# Patient Record
Sex: FEMALE | Race: BLACK OR AFRICAN AMERICAN
[De-identification: names, ages, dates, MRNs, and addresses within clinical notes are randomized per-mention and may not be internally consistent; named-entity substitution may affect disease eponyms.]

---

## 2019-08-27 ENCOUNTER — HOSPITAL ENCOUNTER (OUTPATIENT)
Dept: HOSPITAL 47 - CPPFTMAIN | Age: 64
End: 2019-08-27
Attending: INTERNAL MEDICINE
Payer: MEDICARE

## 2019-08-27 DIAGNOSIS — R06.02: Primary | ICD-10-CM

## 2019-08-27 PROCEDURE — 94726 PLETHYSMOGRAPHY LUNG VOLUMES: CPT

## 2019-08-27 PROCEDURE — 94060 EVALUATION OF WHEEZING: CPT

## 2019-08-27 PROCEDURE — 94729 DIFFUSING CAPACITY: CPT

## 2021-11-17 ENCOUNTER — HOSPITAL ENCOUNTER (EMERGENCY)
Dept: HOSPITAL 47 - EC | Age: 66
Discharge: HOME | End: 2021-11-17
Payer: MEDICARE

## 2021-11-17 VITALS — DIASTOLIC BLOOD PRESSURE: 80 MMHG | HEART RATE: 72 BPM | SYSTOLIC BLOOD PRESSURE: 140 MMHG | TEMPERATURE: 98.2 F

## 2021-11-17 VITALS — RESPIRATION RATE: 20 BRPM

## 2021-11-17 DIAGNOSIS — Z91.040: ICD-10-CM

## 2021-11-17 DIAGNOSIS — E78.5: ICD-10-CM

## 2021-11-17 DIAGNOSIS — Z79.899: ICD-10-CM

## 2021-11-17 DIAGNOSIS — I10: ICD-10-CM

## 2021-11-17 DIAGNOSIS — Z20.822: Primary | ICD-10-CM

## 2021-11-17 DIAGNOSIS — Z88.8: ICD-10-CM

## 2021-11-17 DIAGNOSIS — J45.909: ICD-10-CM

## 2021-11-17 DIAGNOSIS — Z88.5: ICD-10-CM

## 2021-11-17 PROCEDURE — 87635 SARS-COV-2 COVID-19 AMP PRB: CPT

## 2021-11-17 PROCEDURE — 99283 EMERGENCY DEPT VISIT LOW MDM: CPT

## 2021-11-17 NOTE — ED
General Adult HPI





- General


Chief complaint: Recheck/Abnormal Lab/Rx


Stated complaint: Covid Swab


Time Seen by Provider: 11/17/21 15:42


Source: patient, RN notes reviewed


Mode of arrival: ambulatory


Limitations: no limitations





- History of Present Illness


Initial comments: 





This a 66-year-old female presents emergency [facility] with chief complaint of 

needing COVID-19 testing.  Patient's daughters at home positive COVID-19.  

Patient states she has received both vaccines is asymptomatic denies fever cough

congestion Biaxin nausea vomiting diarrhea.





- Related Data


                                Home Medications











 Medication  Instructions  Recorded  Confirmed


 


Albuterol Sulfate [Accuneb] 0 mg INHALATION DAILY 09/08/14 09/08/14


 


Albuterol Sulfate [Ventolin HFA] 1 - 2 puff INHALATION Q6H PRN 09/08/14 09/08/14


 


Enalapril [Vasotec] 10 mg PO DAILY 09/08/14 09/08/14


 


Ibuprofen [Motrin] 800 mg PO Q8HR PRN 09/08/14 09/08/14


 


Montelukast Sodium [Singulair] 10 mg PO DAILY 09/08/14 09/08/14


 


Omeprazole [PriLOSEC] 20 mg PO AC-BRKFST 09/08/14 09/08/14


 


Propranolol HCl 20 mg PO DAILY 09/08/14 09/08/14


 


Simvastatin [Zocor] 40 mg PO HS 09/08/14 09/08/14








                                  Previous Rx's











 Medication  Instructions  Recorded


 


ALPRAZolam [Xanax] 0.25 mg PO TID PRN #30 tablet 09/11/14


 


Clopidogrel [Plavix] 75 mg PO DAILY #30 tab 09/11/14


 


HYDROcodone/APAP 10-325MG [Norco 1 each PO Q8HR PRN #24 tab 09/11/14





]  











                                    Allergies











Allergy/AdvReac Type Severity Reaction Status Date / Time


 


iodine Allergy  Rash/Hives Verified 11/17/21 15:38


 


latex Allergy  Itching Verified 11/17/21 15:38


 


morphine Allergy  Unknown Verified 11/17/21 15:38














Review of Systems


ROS Statement: 


Those systems with pertinent positive or pertinent negative responses have been 

documented in the HPI.





ROS Other: All systems not noted in ROS Statement are negative.





Past Medical History


Past Medical History: Asthma, CVA/TIA, Hyperlipidemia, Hypertension


Additional Past Medical History / Comment(s): CHRONIC PAIN


History of Any Multi-Drug Resistant Organisms: None Reported


Past Surgical History: Orthopedic Surgery


Additional Past Surgical History / Comment(s): NIRAJ KNEE SURGERY


Past Anesthesia/Blood Transfusion Reactions: No Reported Reaction


Past Psychological History: Depression


Smoking Status: Never smoker


Past Alcohol Use History: None Reported


Past Drug Use History: None Reported





General Exam


Limitations: no limitations


General appearance: alert, in no apparent distress


Head exam: Present: atraumatic, normocephalic, normal inspection


ENT exam: Present: normal exam, mucous membranes moist


Neck exam: Present: normal inspection.  Absent: tenderness, meningismus, 

lymphadenopathy


Respiratory exam: Present: normal lung sounds bilaterally.  Absent: respiratory 

distress, wheezes, rales, rhonchi, stridor


Cardiovascular Exam: Present: regular rate, normal rhythm, normal heart sounds. 

Absent: systolic murmur, diastolic murmur, rubs, gallop, clicks





Course


                                   Vital Signs











  11/17/21





  15:38


 


Temperature 98.0 F


 


Pulse Rate 70


 


Respiratory 20





Rate 


 


Blood Pressure 143/93


 


O2 Sat by Pulse 98





Oximetry 














Medical Decision Making





- Medical Decision Making





Patient was here for COVID-19 testing patient has negative.





- Lab Data


                                   Lab Results











  11/17/21 Range/Units





  15:47 


 


Coronavirus (PCR)  Not Detected  (Not Detectd)  














Disposition


Clinical Impression: 


 Encounter for laboratory testing for COVID-19 virus





Disposition: HOME SELF-CARE


Condition: Stable


Additional Instructions: 


Please return to the Emergency Department if symptoms worsen or any other 

concerns.


Is patient prescribed a controlled substance at d/c from ED?: No


Referrals: 


Reese Rowland MD [Primary Care Provider] - 1-2 days


Time of Disposition: 17:10

## 2022-07-11 ENCOUNTER — HOSPITAL ENCOUNTER (OUTPATIENT)
Dept: HOSPITAL 47 - RADCTMAIN | Age: 67
Discharge: HOME | End: 2022-07-11
Attending: INTERNAL MEDICINE
Payer: MEDICARE

## 2022-07-11 DIAGNOSIS — R91.1: Primary | ICD-10-CM

## 2022-07-11 LAB — BUN SERPL-SCNC: 17 MG/DL (ref 7–17)

## 2022-07-11 PROCEDURE — 82565 ASSAY OF CREATININE: CPT

## 2022-07-11 PROCEDURE — 36415 COLL VENOUS BLD VENIPUNCTURE: CPT

## 2022-07-11 PROCEDURE — 84520 ASSAY OF UREA NITROGEN: CPT

## 2022-07-11 PROCEDURE — 71260 CT THORAX DX C+: CPT

## 2022-07-11 NOTE — CT
EXAMINATION TYPE: CT chest w con

CT DLP: 157.1 mGycm, Automated exposure control for dose reduction was used.

 

DATE OF EXAM: 7/11/2022 8:22 AM

 

COMPARISON: Chest radiograph 3/9/2022.

 

CLINICAL INDICATION:Female, 67 years old with history of R91.1 lung nodule; PHH, Lung nodules

 

TECHNIQUE: Multiple axial images were obtained through the chest following the administration of 70 c
c of Isovue 300. 

 

FINDINGS: 

LUNGS/ PLEURA: No pneumothorax, pleural effusion, or focal consolidation. Left upper lobe fibrotic ch
anges demonstrated. Centrilobular emphysematous changes with upper lobe predominance demonstrated.  2
 mm right middle lobe pulmonary nodule (series 4, image 37).

AIRWAY: Patent and unremarkable..

HEART: Size within normal limits. Coronary artery calcifications.

MEDIASTINUM: No gross evidence of adenopathy.

VASCULATURE:  Atherosclerotic calcifications are present throughout the aorta and its branches. No th
oracic aortic aneurysm.

MUSCULOSKELETAL: No acute osseous abnormalities. Remote right-sided rib fractures. [place] screw demon
strated within the right humeral head.

SOFT TISSUES/LYMPH NODES: Unremarkable.

LOWER NECK: No significant findings.

UPPER ABDOMEN: No significant findings. 

 

IMPRESSION:

1. No acute thoracic process.

2. Left upper lobe fibrotic changes with predominantly upper lobe centrilobular changes.

3. 2 mm right middle lobe pulmonary nodule.

 

Pulmonary nodules measuring less than 6 mm. Incidentally detected nodules of this size are generally 
considered benign in individuals without concomitant risk factors such as smoking history or other ri
sk factors for malignancy. Follow up imaging is generally not performed, in accordance with Fle[place]
r Society guidelines. In high-risk patients, a 12 month follow up CT thorax can be considered.

## 2023-07-03 ENCOUNTER — HOSPITAL ENCOUNTER (EMERGENCY)
Dept: HOSPITAL 47 - EC | Age: 68
Discharge: PSYCHIATRIC OR SUBSTANCE USE FACILITY | End: 2023-07-03
Payer: COMMERCIAL

## 2023-07-03 VITALS — TEMPERATURE: 97.8 F

## 2023-07-03 VITALS — DIASTOLIC BLOOD PRESSURE: 106 MMHG | HEART RATE: 74 BPM | SYSTOLIC BLOOD PRESSURE: 160 MMHG | RESPIRATION RATE: 16 BRPM

## 2023-07-03 DIAGNOSIS — S92.344A: Primary | ICD-10-CM

## 2023-07-03 DIAGNOSIS — Z91.040: ICD-10-CM

## 2023-07-03 DIAGNOSIS — Z86.73: ICD-10-CM

## 2023-07-03 DIAGNOSIS — Z88.5: ICD-10-CM

## 2023-07-03 DIAGNOSIS — J45.909: ICD-10-CM

## 2023-07-03 DIAGNOSIS — Z88.8: ICD-10-CM

## 2023-07-03 DIAGNOSIS — E78.5: ICD-10-CM

## 2023-07-03 DIAGNOSIS — Z88.6: ICD-10-CM

## 2023-07-03 DIAGNOSIS — I10: ICD-10-CM

## 2023-07-03 DIAGNOSIS — Z79.899: ICD-10-CM
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
## 2023-07-03 DIAGNOSIS — F32.A: ICD-10-CM

## 2023-07-03 LAB
ALBUMIN SERPL-MCNC: 4.1 G/DL (ref 3.5–5)
ALP SERPL-CCNC: 75 U/L (ref 38–126)
ALT SERPL-CCNC: 21 U/L (ref 4–34)
ANION GAP SERPL CALC-SCNC: 7 MMOL/L
AST SERPL-CCNC: 28 U/L (ref 14–36)
BASOPHILS # BLD AUTO: 0 K/UL (ref 0–0.2)
BASOPHILS NFR BLD AUTO: 0 %
BUN SERPL-SCNC: 16 MG/DL (ref 7–17)
CALCIUM SPEC-MCNC: 9 MG/DL (ref 8.4–10.2)
CHLORIDE SERPL-SCNC: 105 MMOL/L (ref 98–107)
CK SERPL-CCNC: 176 U/L (ref 30–135)
CO2 SERPL-SCNC: 27 MMOL/L (ref 22–30)
EOSINOPHIL # BLD AUTO: 0.1 K/UL (ref 0–0.7)
EOSINOPHIL NFR BLD AUTO: 1 %
ERYTHROCYTE [DISTWIDTH] IN BLOOD BY AUTOMATED COUNT: 4.71 M/UL (ref 3.8–5.4)
ERYTHROCYTE [DISTWIDTH] IN BLOOD: 14.2 % (ref 11.5–15.5)
GLUCOSE SERPL-MCNC: 148 MG/DL (ref 74–99)
HCT VFR BLD AUTO: 40.4 % (ref 34–46)
HGB BLD-MCNC: 13.3 GM/DL (ref 11.4–16)
LYMPHOCYTES # SPEC AUTO: 1.9 K/UL (ref 1–4.8)
LYMPHOCYTES NFR SPEC AUTO: 17 %
MCH RBC QN AUTO: 28.2 PG (ref 25–35)
MCHC RBC AUTO-ENTMCNC: 32.9 G/DL (ref 31–37)
MCV RBC AUTO: 85.8 FL (ref 80–100)
MONOCYTES # BLD AUTO: 0.3 K/UL (ref 0–1)
MONOCYTES NFR BLD AUTO: 3 %
NEUTROPHILS # BLD AUTO: 8.6 K/UL (ref 1.3–7.7)
NEUTROPHILS NFR BLD AUTO: 78 %
PLATELET # BLD AUTO: 296 K/UL (ref 150–450)
POTASSIUM SERPL-SCNC: 3.7 MMOL/L (ref 3.5–5.1)
PROT SERPL-MCNC: 7.4 G/DL (ref 6.3–8.2)
SODIUM SERPL-SCNC: 139 MMOL/L (ref 137–145)
WBC # BLD AUTO: 11 K/UL (ref 3.8–10.6)

## 2023-07-03 PROCEDURE — 82550 ASSAY OF CK (CPK): CPT

## 2023-07-03 PROCEDURE — 74177 CT ABD & PELVIS W/CONTRAST: CPT

## 2023-07-03 PROCEDURE — 86900 BLOOD TYPING SEROLOGIC ABO: CPT

## 2023-07-03 PROCEDURE — 29515 APPLICATION SHORT LEG SPLINT: CPT

## 2023-07-03 PROCEDURE — 99285 EMERGENCY DEPT VISIT HI MDM: CPT

## 2023-07-03 PROCEDURE — 36415 COLL VENOUS BLD VENIPUNCTURE: CPT

## 2023-07-03 PROCEDURE — 85025 COMPLETE CBC W/AUTO DIFF WBC: CPT

## 2023-07-03 PROCEDURE — 86901 BLOOD TYPING SEROLOGIC RH(D): CPT

## 2023-07-03 PROCEDURE — 80306 DRUG TEST PRSMV INSTRMNT: CPT

## 2023-07-03 PROCEDURE — 96375 TX/PRO/DX INJ NEW DRUG ADDON: CPT

## 2023-07-03 PROCEDURE — 96374 THER/PROPH/DIAG INJ IV PUSH: CPT

## 2023-07-03 PROCEDURE — 86850 RBC ANTIBODY SCREEN: CPT

## 2023-07-03 PROCEDURE — 71260 CT THORAX DX C+: CPT

## 2023-07-03 PROCEDURE — 70450 CT HEAD/BRAIN W/O DYE: CPT

## 2023-07-03 PROCEDURE — 80053 COMPREHEN METABOLIC PANEL: CPT

## 2023-07-03 PROCEDURE — 80320 DRUG SCREEN QUANTALCOHOLS: CPT

## 2023-07-03 PROCEDURE — 73560 X-RAY EXAM OF KNEE 1 OR 2: CPT

## 2023-07-03 PROCEDURE — 96376 TX/PRO/DX INJ SAME DRUG ADON: CPT

## 2023-07-03 PROCEDURE — 72125 CT NECK SPINE W/O DYE: CPT

## 2023-07-03 PROCEDURE — 93005 ELECTROCARDIOGRAM TRACING: CPT

## 2023-07-03 PROCEDURE — 73620 X-RAY EXAM OF FOOT: CPT
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
## 2024-08-26 ENCOUNTER — HOSPITAL ENCOUNTER (OUTPATIENT)
Dept: HOSPITAL 47 - RADMAMWWP | Age: 69
Discharge: HOME | End: 2024-08-26
Attending: FAMILY MEDICINE
Payer: MEDICARE

## 2024-08-26 DIAGNOSIS — Z12.31: Primary | ICD-10-CM

## 2024-08-26 PROCEDURE — 77067 SCR MAMMO BI INCL CAD: CPT

## 2024-08-26 PROCEDURE — 77063 BREAST TOMOSYNTHESIS BI: CPT

## 2024-09-08 NOTE — MM
Reason for Exam: Screening  (asymptomatic). 

Last mammogram was performed 5 year(s) and 9 month(s) ago. 





Patient History: 

Menarche at age 15. First Full-Term Pregnancy at age 19. 





Risk Values: 

Connie 5 year model risk: 1.2%.

NCI Lifetime model risk: 4.2%.





Prior Study Comparison: 

2/26/2013 Bilateral Screening Mammogram, [facility]. 9/2/2014 Bilateral Screening Mammogram, [facility]. 9/2/2014 Bilateral Diagnostic Mammogram, [facility]. 11/8/2018 Bilateral Screening

Mammogram, [facility]. 





Tissue Density: 

The breasts are almost entirely fatty.





Findings: 

Analyzed By CAD. 

Right breast: There is no suspicious group of microcalcifications or new suspicious mass.



Left breast: There is no suspicious group of microcalcifications or new suspicious mass. 





Overall Assessment: Negative, BI-RAD 1





Management: 

Screening Mammogram of both breasts in 1 year.

[facility] will attempt to contact patient to return for supplemental views and

ultrasound if indicated.



Patient should continue monthly self-breast exams.  A clinical breast exam by your physician is

recommended on an annual basis.

This exam should not preclude additional follow-up of suspicious palpable abnormalities.



Note on Connie scores and lifetime risk:

1. A Connie score greater than 3% is considered moderate risk. If this is the case, consider

specialist referral to assess eligibility for a risk reducing agent.

2. If overall lifetime risk for the development of breast cancer is 20% or higher, the patient may

qualify for future screening with alternating mammogram and breast MRI.



Electronically signed and approved by: Wil Zhang DO